# Patient Record
(demographics unavailable — no encounter records)

---

## 2025-03-28 NOTE — PHYSICAL EXAM
[Well Developed] : well developed [Well Nourished] : well nourished [No Acute Distress] : no acute distress [Normal Conjunctiva] : normal conjunctiva [Normal Venous Pressure] : normal venous pressure [No Carotid Bruit] : no carotid bruit [Normal S1, S2] : normal S1, S2 [No Rub] : no rub [No Gallop] : no gallop [Clear Lung Fields] : clear lung fields [Good Air Entry] : good air entry [No Respiratory Distress] : no respiratory distress  [Soft] : abdomen soft [Non Tender] : non-tender [No Masses/organomegaly] : no masses/organomegaly [Normal Bowel Sounds] : normal bowel sounds [No Edema] : no edema [No Cyanosis] : no cyanosis [No Clubbing] : no clubbing [No Varicosities] : no varicosities [No Rash] : no rash [No Skin Lesions] : no skin lesions [Moves all extremities] : moves all extremities [No Focal Deficits] : no focal deficits [Normal Speech] : normal speech [Alert and Oriented] : alert and oriented [Normal memory] : normal memory [Murmur] : murmur [de-identified] : bruise on left shoulder with limited mobility [de-identified] : III/VI late peaking at base

## 2025-03-28 NOTE — HISTORY OF PRESENT ILLNESS
[FreeTextEntry1] : Presents for eval due to fall yest.  In morning she was in [employer] and standing in kitchen, felt spinning and went down to floor, had minor head trauma and arm contusion.  No CP or SOB.  No palps.  She steadied herself after 10 min and stood back up.  No further dizziness.  No compl today.  She has been following with an ENT due to hearing loss and possible ear infection. No CP, SOB, palps. Has been off Atorvastatin on her own for 1 month because she thought it could be cause of recurrent UTI.

## 2025-03-28 NOTE — ASSESSMENT
[FreeTextEntry1] : Unclear etio of dizziness episode yesterday. Based on symptoms sound more vertiginous but patient with know mod to sev AS from 6 mo ago.

## 2025-03-28 NOTE — HISTORY OF PRESENT ILLNESS
[de-identified] : 92F here to establish care and CPE  Hx of HTN, CAD, structural heart and valve disease. Sees Cards. Sensorineural hearing loss, follows with ENT.  CKD stage 3b, egfr 45,

## 2025-03-28 NOTE — DISCUSSION/SUMMARY
[FreeTextEntry1] : 3-day cardiac monitor. Referred to structural heart team for TAVR eval. Advised call if symptoms recur. Following up with primary care Same meds for now. Recommended xray left shoulder, patient prefers to defer until sees PCP later today.  [EKG obtained to assist in diagnosis and management of assessed problem(s)] : EKG obtained to assist in diagnosis and management of assessed problem(s)

## 2025-04-07 NOTE — REASON FOR VISIT
[Structural Heart and Valve Disease] : structural heart and valve disease [FreeTextEntry3] : Dr. DIAMOND Hampton

## 2025-04-07 NOTE — HISTORY OF PRESENT ILLNESS
[FreeTextEntry1] : Ms. Santos is a 92 year-old-female who is being referred to our clinic today by Dr. Hampton for evaluation of her Aortic Stenosis. She has a past medical history of HLD, HFpEF, HTN and Type 2 Diabetes Mellitus.  Today she presents  She had a Transthoracic Echocardiogram today which was evaluated with Dr. Adriana Fournier, and demonstrates    NYHA Classification: STS 30 day Mortality Risk Score: 12.7% [Diabetes Mellitus] : Diabetes Mellitus [Oral] : controlled with oral diabetes medication [Insulin] : controlled with insulin [Dyslipidemia] : Dyslipidemia [Hypertension] : Hypertension [Inhaled Medication Therapy] : Inhaled Medication Therapy [Class III] : Class III [Prior Heart Failure] : Prior Heart Failure

## 2025-04-07 NOTE — HISTORY OF PRESENT ILLNESS
[FreeTextEntry1] : Ms. Santos is a 92 year-old-female who is being referred to our clinic today by Dr. Hampton for evaluation of her Aortic Stenosis. She has a past medical history of HLD, HFpEF, HTN and Type 2 Diabetes Mellitus.  Today she presents  She had a Transthoracic Echocardiogram today which was evaluated with Dr. Adriana Fournier, and demonstrates    NYHA Classification: STS 30 day Mortality Risk Score: 12.7% [Diabetes Mellitus] : Diabetes Mellitus [Oral] : controlled with oral diabetes medication [Insulin] : controlled with insulin [Dyslipidemia] : Dyslipidemia [Hypertension] : Hypertension [Inhaled Medication Therapy] : Inhaled Medication Therapy [Class III] : Class III <<--- Click to launch [Prior Heart Failure] : Prior Heart Failure 55.8

## 2025-04-11 NOTE — HISTORY OF PRESENT ILLNESS
[FreeTextEntry1] : Telehealth visit requested by patient.  Patient at her home acc by son.  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 Real time 2-way AV technology. Patient identified. Wants to review recent visit with structural heart team who rec TAVR. Echo showed severe AS. She and her son have some concerns about cardiac cath and potential coronary intervention if needed. She is feeling OK, no further dizziness or falls or syncope. Denies SOB. She is fairly sedentary.

## 2025-04-11 NOTE — DISCUSSION/SUMMARY
[FreeTextEntry1] : Reviewed condition, necessary work up prior to TAVR incl cardiac cath/coronary angio. Discussed relative risks. She is agreeable to proceed. Holter reviewed with them - no bradycardia. Only brief SVT.

## 2025-05-01 NOTE — ASSESSMENT
[FreeTextEntry1] : Alert, oriented, well, pleasant. h/o Sebohheic dermatitis.  erythema and scale ears. Face clear. Scalp not examined. Hairpiece. Seborrheic dermatitis. Continue TSal shampoo. Lather in to scalp and ears, rinse off after 3 minutes. Washes three times per week. start mometasone ointment twice per day for 1 month. Cleans hearing aids daily with alcohol pad. Continue.  f/u 1 month. Use Therapeutic Ranged Or Therapeutic Target: please select Range or Target Dosing Month 1 (Required For Cumulative Dosing): 20mg Daily Xerosis Aggressive Treatment: I recommended application of Cetaphil or CeraVe numerous times a day and before going to bed to all dry areas. I also prescribed a topical steroid for twice daily use. Retinoid Dermatitis Aggressive Treatment: I recommended more frequent application of Cetaphil or CeraVe to the areas of dermatitis. I also prescribed a topical steroid for twice daily use until the dermatitis resolves. Nosebleeds Normal Treatment: I explained this is common when taking isotretinoin. I recommended saline mist in each nostril multiple times a day. If this worsens they will contact us. What Is The Patient's Gender: Female Pounds Preamble Statement (Weight Entered In Details Tab): Reported Weight in pounds: Dosing Month 2 (Required For Cumulative Dosing): 40mg Daily Kilograms Preamble Statement (Weight Entered In Details Tab): Reported Weight in kilograms: Hypertriglyceridemia Monitoring: I explained this is common when taking isotretinoin. If this worsens they will contact us.  Discussed diet changes and to fast before next blood test Any Hypercholesterolemia?: No Depression Monitoring: Upon further questioning or denies depression but did ck yes on intake. Pt explains she has been working ? a lot? and gets ? frustrated ?. She denies sadness, anger, hopelessness, and suicidal thoughts. No Depression (Free Text): Pt says she feels fine. Just sosa with her cycle. Denies depression, sadness, or suicidal thoughts. Lower Range (In Mg/Kg): 120 Upper Range (In Mg/Kg): 150 Xerosis Normal Treatment: I recommended application of Cetaphil or CeraVe numerous times a day going to bed to all dry areas. Female Completion Statement: After discussing her treatment course we decided to discontinue isotretinoin therapy at this time. I explained that she would need to continue her birth control methods for at least one month after the last dosage. She should also get a pregnancy test one month after the last dose. She shouldn't donate blood for one month after the last dose. She should call with any new symptoms of depression. Myalgia Monitoring: I explained this is common when taking isotretinoin. If this worsens they will contact us. Male Completion Statement: After discussing his treatment course we decided to discontinue isotretinoin therapy at this time. He shouldn't donate blood for one month after the last dose. He should call with any new symptoms of depression. Myalgia Treatment: I explained this is common when taking isotretinoin. If this worsens they will contact us. They may try OTC ibuprofen. Target Cumulative Dosage (In Mg/Kg): 135 Hypertriglyceridemia Treatment: I explained this is common when taking isotretinoin. If this worsens they will contact us. Discussed diet and alcohol intake. Show Text Field For Brand Names Of Contraception?: Yes Headache Monitoring: I recommended monitoring the headaches for now. There is no evidence of increased intracranial pressure. They were instructed to call if the headaches are worsening. Are Labs Available For Review?: No- Not Drawn Yet Hypercholesterolemia Monitoring: I explained this is common when taking isotretinoin. We will monitor closely. Ipledge Number (Optional): [id number] Cheilitis Aggressive Treatment: I recommended application of Vaseline or Aquaphor numerous times a day (as often as every hour) and before going to bed. I also prescribed a topical steroid for twice daily use. Counseling Text: I reviewed the side effect in detail. Patient should get monthly blood tests, not donate blood, not drive at night if vision affected, and not share medication. Weight Units: pounds Xerosis Aggressive Treatment: I recommended application of Cetaphil or CeraVe numerous times a day going to bed to all dry areas. I also prescribed a topical steroid for twice daily use. Retinoid Dermatitis Normal Treatment: I recommended more frequent application of Cetaphil or CeraVe to the areas of dermatitis. Female Pregnancy Counseling Text: Female patients should also be on two forms of birth control while taking this medication and for one month after their last dose. Cheilitis Normal Treatment: I recommended application of Vaseline or Aquaphor numerous times a day (as often as every hour) and before going to bed. Is Cheilitis Present?: Yes - Normal Treatment Patient Weight (Optional But Required For Cumulative Dose-Numbers And Decimals Only): 130 Months Of Therapy Completed: 2 Xerosis Normal Treatment: I recommended application of Cetaphil or CeraVe numerous times a day and before going to bed to all dry areas. Detail Level: Zone Any Depression?: No - Free Text

## 2025-05-01 NOTE — HISTORY OF PRESENT ILLNESS
[FreeTextEntry1] : Dry scaly rash on ears. Moisturizer cream. Hearing aids in place. Sees ENT for canal cleaning. [de-identified] : No personal or family history of cutaneous malignancy.    at [employer].

## 2025-05-08 NOTE — DISCUSSION/SUMMARY
[FreeTextEntry1] : Ms. SHAIKH has symptomatic severe AS. Dr Bryant and I are in agreement that he is an appropriate candidate for TAVR consideration. As such, I have recommended she be scheduled for a cardiac CT, coronary angiogram, and be given a tentative date for TAVR. She has a family reunion in June and would like to speak with her family before making a decision on timing, i.e. before or after June. I suggested she get it done before June, and if we start testing now, TAVR will be done no later than the first week in May. She said she will call us within a week with her decision. She is awaiting the results of the recent MCT with Dr Hampton. I advised she stop driving until her AS is treated; she notes her family advised the same and she will stop driving until her AS is treated, after which it will be reassessed. I discussed the potential risks and benefits of the procedure with the patient in detail including death, stroke, bleeding, infection, PVL, vascular complications, and the possible need for a permanent pacemaker. All questions were answered in detail.

## 2025-05-13 NOTE — PHYSICAL EXAM
[General Appearance - Alert] : alert [General Appearance - In No Acute Distress] : in no acute distress [Neck Appearance] : the appearance of the neck was normal [Jugular Venous Distention Increased] : there was no jugular-venous distention [Exaggerated Use Of Accessory Muscles For Inspiration] : no accessory muscle use [Auscultation Breath Sounds / Voice Sounds] : lungs were clear to auscultation bilaterally [Apical Impulse] : the apical impulse was normal [Heart Sounds] : normal S1 and S2 [Systolic grade ___/6] : A grade [unfilled]/6 systolic murmur was heard. [Arterial Pulses Carotid] : carotid pulses were normal with no bruits [Nonpitting Edema] : nonpitting edema present [___ +] : bilateral [unfilled]+ pitting edema to the ankles [Bowel Sounds] : normal bowel sounds [Abdomen Soft] : soft [Abdomen Tenderness] : non-tender [Abdomen Mass (___ Cm)] : no abdominal mass palpated [Abnormal Walk] : normal gait [Skin Color & Pigmentation] : normal skin color and pigmentation [] : no rash [No Focal Deficits] : no focal deficits [Oriented To Time, Place, And Person] : oriented to person, place, and time [Normal] : well developed, well nourished, no acute distress

## 2025-05-13 NOTE — HISTORY OF PRESENT ILLNESS
[Diabetes Mellitus] : Diabetes Mellitus [Oral] : controlled with oral diabetes medication [Insulin] : controlled with insulin [Dyslipidemia] : Dyslipidemia [Hypertension] : Hypertension [Inhaled Medication Therapy] : Inhaled Medication Therapy [Class III] : Class III [Prior Heart Failure] : Prior Heart Failure [FreeTextEntry1] : Ms. Santos is a 92-year-old-female referred to our valve clinic today by Dr. Hampton for evaluation of her Aortic Stenosis. She has a past medical history of HLD, HFpEF, HTN and Type 2 Diabetes Mellitus.  Today she presents and reports feeling OK. A few weeks ago, she had an episode at home where she became dizzy and fell but without reported syncope. She went to Dr. Hampton who placed her on a Holter monitor (awaiting results). She denies any SOB, CP or fatigue with activity. She will get occasional Pedal Edema. She sleeps with 2 pillows at night and has no SOB waking her from sleep. She was never a smoker. She has never been hospitalized for any cardiac reasons.  She had a Transthoracic Echocardiogram today which was evaluated with Dr. Adriana Fournier and demonstrates the following: Left ventricular cavity is normal in size. Left ventricular systolic function is normal. Normal right ventricular cavity size and normal right ventricular systolic function. The aortic valve appears trileaflet with reduced systolic excursion.  There is calcification of the aortic valve leaflets.  There is severe aortic stenosis.  The peak transaortic velocity is 4.18 m/s, peak transaortic gradient is 69.9 mmHg and mean transaortic gradient is 39.6 mmHg with an LVOT/aortic valve VTI ratio of 0.19. The aortic valve acceleration time is 153 msec. The aortic valve area is estimated at 0.60 cm2 by the continuity equation.  There is no evidence of aortic regurgitation. There is no other significant valvular regurgitation or stenosis.  She had an episode last week where she walked into her kitchen, went to put the coffee on, became dizzy, looked up and saw the room spinning, and "went down" but reports no LOC. She attributed this to hypoglycemia but her glucose when she checked it was 106. She had not eaten dinner the night prior. She notes "I do get tired once in a while" but no EMANUEL. 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 She has no angina. She drives. Her daughter notes "her biggest problem is her back" with spinal stenosis and arthritis. She has minor LE edema that she describes as "just a little" which is not new; Dr Hampton prescribed her prn Lasix, but she notes "I can't see taking a pill like that" and has never taken it.   =========== 5/8/2025 Patient presents with her daughter to discuss cardiac catheterization prior to her TAVR procedure.  There has been no change in her symptoms since she was last seen in the structural heart office.  NYHA Classification: II STS 30 day Mortality Risk Score: 12.7%  Assessment/Plan Severe aortic valve stenosis --The patient now presents for consultation for cardiac catheterization that is scheduled to be performed prior to the TAVR.  Indications and details for the cardiac catheterization reviewed.  Benefits and risks were discussed.  Risks include but not limited to infection, bleeding, arrhythmia, TIA/stroke, hemodynamic stability, vascular injury, need for urgent surgery and death.  If his stent needs to be placed the patient will need to be on dual antiplatelet therapy.  She is currently on aspirin 81 mg daily.  Indications for dual antiplatelet therapy/clopidogrel was gone over.  Side effects of clopidogrel and heightened risk of bleeding was gone over.   --Continue atorvastatin 20 mg daily.   --Continue metoprolol succinate 25 mg daily and [id number] mg daily. --Echocardiogram today which was evaluated with Dr. Adriana Fournier and demonstrates the following: Left ventricular cavity is normal in size. Left ventricular systolic function is normal. Normal right ventricular cavity size and normal right ventricular systolic function. The aortic valve appears trileaflet with reduced systolic excursion. There is calcification of the aortic valve leaflets. There is severe aortic stenosis. The peak transaortic velocity is 4.18 m/s, peak transaortic gradient is 69.9 mmHg and mean transaortic gradient is 39.6 mmHg with an LVOT/aortic valve VTI ratio of 0.19. The aortic valve acceleration time is 153 msec. The aortic valve area is estimated at 0.60 cm2 by the continuity equation. There is no evidence of aortic regurgitation. There is no other significant valvular regurgitation or stenosis. --EKG sinus rhythm with frequent APCs with , QRS 75 and QTc 401  All questions and concerns of the patient and her family were addressed.

## 2025-05-13 NOTE — REVIEW OF SYSTEMS
[Feeling Tired] : feeling tired [Lower Ext Edema] : lower extremity edema [Negative] : Heme/Lymph [Fever] : no fever [Chills] : no chills [Feeling Poorly] : not feeling poorly [Chest Pain] : no chest pain [Palpitations] : no palpitations

## 2025-05-13 NOTE — REASON FOR VISIT
[Structural Heart and Valve Disease] : structural heart and valve disease [Consultation] : a consultation visit [Family Member] : family member [FreeTextEntry3] : Dr. DIAMOND Hampton

## 2025-05-13 NOTE — HISTORY OF PRESENT ILLNESS
[Diabetes Mellitus] : Diabetes Mellitus [Oral] : controlled with oral diabetes medication [Insulin] : controlled with insulin [Dyslipidemia] : Dyslipidemia [Hypertension] : Hypertension [Inhaled Medication Therapy] : Inhaled Medication Therapy [Class III] : Class III [Prior Heart Failure] : Prior Heart Failure [FreeTextEntry1] : Ms. Santos is a 92-year-old-female referred to our valve clinic today by Dr. Hampton for evaluation of her Aortic Stenosis. She has a past medical history of HLD, HFpEF, HTN and Type 2 Diabetes Mellitus.  Today she presents and reports feeling OK. A few weeks ago, she had an episode at home where she became dizzy and fell but without reported syncope. She went to Dr. Hampton who placed her on a Holter monitor (awaiting results). She denies any SOB, CP or fatigue with activity. She will get occasional Pedal Edema. She sleeps with 2 pillows at night and has no SOB waking her from sleep. She was never a smoker. She has never been hospitalized for any cardiac reasons.  She had a Transthoracic Echocardiogram today which was evaluated with Dr. Adriana Fournier and demonstrates the following: Left ventricular cavity is normal in size. Left ventricular systolic function is normal. Normal right ventricular cavity size and normal right ventricular systolic function. The aortic valve appears trileaflet with reduced systolic excursion.  There is calcification of the aortic valve leaflets.  There is severe aortic stenosis.  The peak transaortic velocity is 4.18 m/s, peak transaortic gradient is 69.9 mmHg and mean transaortic gradient is 39.6 mmHg with an LVOT/aortic valve VTI ratio of 0.19. The aortic valve acceleration time is 153 msec. The aortic valve area is estimated at 0.60 cm2 by the continuity equation.  There is no evidence of aortic regurgitation. There is no other significant valvular regurgitation or stenosis.  She had an episode last week where she walked into her kitchen, went to put the coffee on, became dizzy, looked up and saw the room spinning, and "went down" but reports no LOC. She attributed this to hypoglycemia but her glucose when she checked it was 106. She had not eaten dinner the night prior. She notes "I do get tired once in a while" but no EMANUEL. 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 She has no angina. She drives. Her daughter notes "her biggest problem is her back" with spinal stenosis and arthritis. She has minor LE edema that she describes as "just a little" which is not new; Dr Hampton prescribed her prn Lasix, but she notes "I can't see taking a pill like that" and has never taken it.   =========== 5/8/2025 Patient presents with her daughter to discuss cardiac catheterization prior to her TAVR procedure.  There has been no change in her symptoms since she was last seen in the structural heart office.  NYHA Classification: II STS 30 day Mortality Risk Score: 12.7%  Assessment/Plan Severe aortic valve stenosis --The patient now presents for consultation for cardiac catheterization that is scheduled to be performed prior to the TAVR.  Indications and details for the cardiac catheterization reviewed.  Benefits and risks were discussed.  Risks include but not limited to infection, bleeding, arrhythmia, TIA/stroke, hemodynamic stability, vascular injury, need for urgent surgery and death.  If his stent needs to be placed the patient will need to be on dual antiplatelet therapy.  She is currently on aspirin 81 mg daily.  Indications for dual antiplatelet therapy/clopidogrel was gone over.  Side effects of clopidogrel and heightened risk of bleeding was gone over.   --Continue atorvastatin 20 mg daily.   --Continue metoprolol succinate 25 mg daily and [id number] mg daily. --Echocardiogram today which was evaluated with Dr. Adriana Fournier and demonstrates the following: Left ventricular cavity is normal in size. Left ventricular systolic function is normal. Normal right ventricular cavity size and normal right ventricular systolic function. The aortic valve appears trileaflet with reduced systolic excursion. There is calcification of the aortic valve leaflets. There is severe aortic stenosis. The peak transaortic velocity is 4.18 m/s, peak transaortic gradient is 69.9 mmHg and mean transaortic gradient is 39.6 mmHg with an LVOT/aortic valve VTI ratio of 0.19. The aortic valve acceleration time is 153 msec. The aortic valve area is estimated at 0.60 cm2 by the continuity equation. There is no evidence of aortic regurgitation. There is no other significant valvular regurgitation or stenosis. --EKG sinus rhythm with frequent APCs with , QRS 75 and QTc 401  All questions and concerns of the patient and her family were addressed.

## 2025-06-24 NOTE — REASON FOR VISIT
[Family Member] : family member [de-identified] : TAVR using a 29mm Medtronic EVOLUT Fx [de-identified] : 6/20/25 [de-identified] : This is 91 y/o female with PMH of HTN, HLD, HFpEF, DM (on insulin), lower back stenosis, and nonrheumatic aortic valve stenosis s/p TAVR on 6/20/2025. Pt noted to have b/l groin fungal rash and was given an dose of diflucan after the TAVR.  New LBBB after TAVR which resolved before discharge.    Pt state that she is less fatigued since TAVR.  Has not been placing interdry in the groins.

## 2025-06-24 NOTE — PHYSICAL EXAM
[] : no respiratory distress [Respiration, Rhythm And Depth] : normal respiratory rhythm and effort [Auscultation Breath Sounds / Voice Sounds] : lungs were clear to auscultation bilaterally [Apical Impulse] : the apical impulse was normal [Heart Rate And Rhythm] : heart rate was normal and rhythm regular [Heart Sounds] : normal S1 and S2 [Murmurs] : no murmurs [Clean] : clean [Dry] : dry [Healing Well] : healing well [No Edema] : no edema [FreeTextEntry3] : B/L groin fungal rash

## 2025-06-24 NOTE — ASSESSMENT
[FreeTextEntry1] : This is 93 y/o female with PMH of HTN, HLD, HFpEF, DM (on insulin), lower back stenosis, and nonrheumatic aortic valve stenosis s/p TAVR.  Fungal groin rash has improved on the right groin, stable on the left.    - abx ppx discussed with patient - continue current meds - restart diflucan 150mg po weekly for 2 weeks - f/u with PMD if fungal groin rash persists - reinforced the need for interdry - f/u with cardiology with TTE in 1 month - f/u prn

## 2025-06-25 NOTE — REASON FOR VISIT
[Post Hospitalization] : a post hospitalization visit [Formal Caregiver] : formal caregiver [FreeTextEntry1] : FOLLOW YOUR HEART TEAM

## 2025-06-25 NOTE — PLAN
[TextEntry] : 1) Weigh yourself in the AM prior to eating & drinking. Contact FY team if you note a wt gain of 1-2 lbs overnight or 5 lbs within 1 week. Continue current meds. Keep your legs elevated & ambulate as tolerated. Continue incentive spirometry 10x/hr while awake. Shower using mild soap daily. Your diet should be low salt, low fat, high protein. 2) Call [facility] team 24/7 w/ any questions, issues, or concerns. My number [phone] was provided to the pt. Explained to pt I personally work from Mon to Fri from 8a to 4p but before or after those hours this number still functions 24/7 and pt will get in touch w/ a team member of CT Surgeon at all times. 3) Report to your FY NP any signs of infection such as redness, swelling, warmth, drainage, or pain at an incision site. Additionally, report to your FY NP if you develop a fever. 4) Do not lift anything more than 5 lbs. 5) Do not drive until you are cleared to do so by your surgeon. 6) Please walk 3x/day.  7) Use interdry in b/l groin sites.  FOLLOW UP APPOINTMENTS: CTSx:  (6/24)   CARDIOLOGIST: MD Memo- Pt advised to call to schedule appt within 2 weeks PCP: Pt advised to call to schedule appt within 1 month of discharge.

## 2025-06-25 NOTE — PHYSICAL EXAM
[Sclera] : the sclera and conjunctiva were normal [Neck Appearance] : the appearance of the neck was normal [] : no respiratory distress [Respiration, Rhythm And Depth] : normal respiratory rhythm and effort [Exaggerated Use Of Accessory Muscles For Inspiration] : no accessory muscle use [Auscultation Breath Sounds / Voice Sounds] : lungs were clear to auscultation bilaterally [Apical Impulse] : the apical impulse was normal [Heart Rate And Rhythm] : heart rate was normal and rhythm regular [Heart Sounds] : normal S1 and S2 [Heart Sounds Gallop] : no gallops [Murmurs] : no murmurs [Heart Sounds Pericardial Friction Rub] : no pericardial rub [FreeTextEntry1] : Heart monitor in place. TAVR sites w/o drainage or warmth, with edges well approximated. B/L groin sites w/ erythema from fungal infection. B/L LE - no edema.  [Examination Of The Chest] : the chest was normal in appearance [Chest Visual Inspection Thoracic Asymmetry] : no chest asymmetry [Diminished Respiratory Excursion] : normal chest expansion [Bowel Sounds] : normal bowel sounds [Abdomen Soft] : soft [Abnormal Walk] : normal gait [Skin Color & Pigmentation] : normal skin color and pigmentation [No Focal Deficits] : no focal deficits [Oriented To Time, Place, And Person] : oriented to person, place, and time [Impaired Insight] : insight and judgment were intact [Affect] : the affect was normal [Mood] : the mood was normal

## 2025-06-25 NOTE — ASSESSMENT
[FreeTextEntry1] : Pt recovering well at home s/p cardiac surgery. In home aide, Mary, is staying w/ pt for now. Reviewed all medications and dosages with pt understanding. Pt has all medications in home and is taking as prescribed. Pain controlled with current medication regimen.Pt is aware of F/U appts scheduled and that need to be scheduled as listed below.

## 2025-06-25 NOTE — HISTORY OF PRESENT ILLNESS
[FreeTextEntry1] : This is 91 y/o female with PMH of HTN, HLD, HFpEF, DM (on insulin), lower back stenosis, and nonrheumatic aortic valve stenosis who presents to [facility] for scheduled TAVR on 6/20/2025. Patient and daughter report fatigue and two episodes of dizziness in the last few months that may have been caused by AS or hypogycemia but overall denies SOB, CP, orthopnea, EMANUEL, syncope, and recurrent dizziness. She also denies fever, chills, cough.  6/20 TAVR using a 29mm [employer] EVOLUT Fx THV. TVP left in for pre-existing 1' AVB and new LBBB. Right groin 18FrA sheath - closed with Perclose x 2. Left groin 6FrA sheath removed with angioseal x 1. Left groin 8FrV sheath - TVP in place. Resume ASA in 6 hours. LBBB resolved. TVP D/C'd at 14:00. Echo/EKG ordered for AM. Holding Beta-Blockers for now. Transferred to [address] [place] [address] Telemetry floor. Pt s/p Diflucan IV x1 dose for bilateral groin rash. 6/21 VSS, EKG SR 1st degree 80, Beta-blocker on hold. TTE done: No prosthetic aortic stenosis. Mild paravalvular regurgitation. Pt is cleared for discharge home today per Dr. Bryant with Interdry to bilateral groins 6/24- Started on Diflucan for B/L groin fungal infection by Dr Bryant 6/25- Seen by [facility] NP for a f/u home visit. Emotional support and education provided.  All questions answered. Pt overall is recovering well w/o complaints.

## 2025-07-03 NOTE — DISCUSSION/SUMMARY
[FreeTextEntry1] : Stable. Contin aspirin 81 mg. Off statin until 7 d post last antifungal dose Contin irbesartan Off BB. Sched echo 1 mo

## 2025-07-03 NOTE — PHYSICAL EXAM

## 2025-07-03 NOTE — HISTORY OF PRESENT ILLNESS
[FreeTextEntry1] : Hosp follow up.  Status post TAVR 6/20.  Post op transient LBBB. Was discharged off beta blocker. On antifungal for skin infection. She is unsure about her meds incl aspirin. No CP or SOB.  Feels better, more energy. Has monitor patch on. Med rec reviewed.

## 2025-08-18 NOTE — DISCUSSION/SUMMARY
[FreeTextEntry1] : Stable cardiac status. Contin current meds. Follow up results of cardiac monitor. Follow up 2 months.  ADDENDUM 8/18/25 - NO CARDIAC CONTRAINDICATIONS TO EPIDURAL PROCEDURE FOR PAIN MANGEMENT. CONTINUE ASPIRIN ADRIAN-PROCEDURE.

## 2025-08-18 NOTE — HISTORY OF PRESENT ILLNESS
[FreeTextEntry1] : Here for follow up. Echo prelim results reviewed with patient. No CP, SOB, dizziness. Just had cardiac monitor returned.